# Patient Record
Sex: FEMALE | Race: WHITE | NOT HISPANIC OR LATINO
[De-identification: names, ages, dates, MRNs, and addresses within clinical notes are randomized per-mention and may not be internally consistent; named-entity substitution may affect disease eponyms.]

---

## 2018-07-25 ENCOUNTER — RESULT CHARGE (OUTPATIENT)
Age: 28
End: 2018-07-25

## 2018-07-25 ENCOUNTER — APPOINTMENT (OUTPATIENT)
Dept: FAMILY MEDICINE | Facility: CLINIC | Age: 28
End: 2018-07-25
Payer: COMMERCIAL

## 2018-07-25 VITALS
DIASTOLIC BLOOD PRESSURE: 80 MMHG | WEIGHT: 152 LBS | HEART RATE: 70 BPM | HEIGHT: 64 IN | SYSTOLIC BLOOD PRESSURE: 114 MMHG | BODY MASS INDEX: 25.95 KG/M2 | OXYGEN SATURATION: 97 %

## 2018-07-25 DIAGNOSIS — Z78.9 OTHER SPECIFIED HEALTH STATUS: ICD-10-CM

## 2018-07-25 DIAGNOSIS — N80.9 ENDOMETRIOSIS, UNSPECIFIED: ICD-10-CM

## 2018-07-25 PROBLEM — Z00.00 ENCOUNTER FOR PREVENTIVE HEALTH EXAMINATION: Status: ACTIVE | Noted: 2018-07-25

## 2018-07-25 LAB
BILIRUB UR QL STRIP: NORMAL
CYTOLOGY CVX/VAG DOC THIN PREP: NORMAL
GLUCOSE UR-MCNC: NORMAL
HCG UR QL: 0.2 EU/DL
HGB UR QL STRIP.AUTO: NORMAL
KETONES UR-MCNC: NORMAL
LEUKOCYTE ESTERASE UR QL STRIP: NORMAL
NITRITE UR QL STRIP: NORMAL
PH UR STRIP: 7
PROT UR STRIP-MCNC: NORMAL
SP GR UR STRIP: 1.01

## 2018-07-25 PROCEDURE — 99213 OFFICE O/P EST LOW 20 MIN: CPT | Mod: 25

## 2018-07-25 PROCEDURE — 81003 URINALYSIS AUTO W/O SCOPE: CPT | Mod: QW

## 2018-07-25 NOTE — PHYSICAL EXAM
[No Acute Distress] : no acute distress [Well Nourished] : well nourished [Well Developed] : well developed [Well-Appearing] : well-appearing [No Respiratory Distress] : no respiratory distress  [Clear to Auscultation] : lungs were clear to auscultation bilaterally [No Accessory Muscle Use] : no accessory muscle use [Normal Rate] : normal rate  [Regular Rhythm] : with a regular rhythm [Normal S1, S2] : normal S1 and S2 [No Murmur] : no murmur heard [Soft] : abdomen soft [Non-distended] : non-distended [No Masses] : no abdominal mass palpated [No CVA Tenderness] : no CVA  tenderness [Normal Gait] : normal gait [Coordination Grossly Intact] : coordination grossly intact [No Focal Deficits] : no focal deficits [Normal Affect] : the affect was normal [Alert and Oriented x3] : oriented to person, place, and time [Normal Insight/Judgement] : insight and judgment were intact [de-identified] : mild lower abdominal/ pelvic pain

## 2018-07-25 NOTE — HISTORY OF PRESENT ILLNESS
[FreeTextEntry8] : Pt is here for poss UTI. Pt c/o lower back/abdominal pain, burning urination, frequent/urgent need to urinate for about 5 days and blood in urine for about 2 days. Taking urostat with some relief. Patient also has a h/o endometriosis, has appt scheduled with gyn in 2 weeks for follow up, she reports has been spotting. Denies known fever, chills, nausea, vomiting.

## 2018-07-25 NOTE — PLAN
[FreeTextEntry1] : Check culture\par Start abx, take with food/probiotic \par UTI preventative measures reviewed with patient \par RTO if symptoms worsen, persist or fevers develop\par

## 2018-07-27 LAB — BACTERIA UR CULT: ABNORMAL

## 2018-08-03 ENCOUNTER — APPOINTMENT (OUTPATIENT)
Dept: FAMILY MEDICINE | Facility: CLINIC | Age: 28
End: 2018-08-03
Payer: COMMERCIAL

## 2018-08-03 VITALS
DIASTOLIC BLOOD PRESSURE: 70 MMHG | HEART RATE: 72 BPM | OXYGEN SATURATION: 98 % | TEMPERATURE: 98.9 F | SYSTOLIC BLOOD PRESSURE: 120 MMHG

## 2018-08-03 LAB — S PYO AG SPEC QL IA: NORMAL

## 2018-08-03 PROCEDURE — 87880 STREP A ASSAY W/OPTIC: CPT | Mod: QW

## 2018-08-03 PROCEDURE — 92567 TYMPANOMETRY: CPT

## 2018-08-03 PROCEDURE — 99213 OFFICE O/P EST LOW 20 MIN: CPT | Mod: 25

## 2018-08-03 RX ORDER — NORETHINDRONE ACETATE AND ETHINYL ESTRADIOL 1; 20 MG/1; UG/1
1-20 TABLET ORAL
Qty: 21 | Refills: 0 | Status: DISCONTINUED | COMMUNITY
Start: 2017-12-07 | End: 2018-08-03

## 2018-08-03 RX ORDER — NITROFURANTOIN (MONOHYDRATE/MACROCRYSTALS) 25; 75 MG/1; MG/1
100 CAPSULE ORAL TWICE DAILY
Qty: 10 | Refills: 0 | Status: DISCONTINUED | COMMUNITY
Start: 2018-07-25 | End: 2018-08-03

## 2018-08-03 NOTE — ASSESSMENT
[FreeTextEntry1] : Take antibiotics,  rest,  increase fluids, wash hands to prevent the spread of  infection . Take mucinex dm over the counter. Take tylenol or advil for fever or body aches. Follow up if no better or worse respiratory symptoms.\par tympanogram\par cough syrup prescribed. \par We spent sufficient time to discuss aspects of care; questions were answered  to patient's satisfaction.The diagnosis and care plan were discussed with patient in detail.  Patient test results were  reviewed and explained in full. All questions were answered.\par

## 2018-08-03 NOTE — PHYSICAL EXAM
[Ill-Appearing] : ill-appearing [Barky Cough] : a barky cough was heard [Wheezing Bilaterally] : no wheezing was heard [Rhonchi Bilateral] : rhonchi were heard diffusely over both lungs [Decreased Breath Sounds] : breath sounds were not diminished [Normal Rate] : normal [Normal S1] : normal S1 [Normal S2] : normal S2 [No Murmur] : no murmurs heard [No Pitting Edema] : no pitting edema present [2+] : left 2+ [No Abnormalities] : the abdominal aorta was not enlarged and no bruit was heard [S3] : no S3 [S4] : no S4 [Right Carotid Bruit] : no bruit heard over the right carotid [Left Carotid Bruit] : no bruit heard over the left carotid [Right Femoral Bruit] : no bruit heard over the right femoral artery [Left Femoral Bruit] : no bruit heard over the left femoral artery [de-identified] : small wax noted

## 2018-08-03 NOTE — HISTORY OF PRESENT ILLNESS
[FreeTextEntry8] : Pt is here c/o pain in bilateral ears radiating down jaw for about 5 days. Pt states it started as a sore throat and she lost her voice for 2 days. Pt states she has a phlegmy cough in the morning and through out the day it turns into a dry cough. I am taking advil cold and sinus.  I usually can fight it but I have an low immune system right now I just had UTI

## 2018-08-20 ENCOUNTER — RX CHANGE (OUTPATIENT)
Age: 28
End: 2018-08-20

## 2019-08-14 ENCOUNTER — APPOINTMENT (OUTPATIENT)
Dept: FAMILY MEDICINE | Facility: CLINIC | Age: 29
End: 2019-08-14
Payer: COMMERCIAL

## 2019-08-14 VITALS
WEIGHT: 137 LBS | RESPIRATION RATE: 14 BRPM | DIASTOLIC BLOOD PRESSURE: 70 MMHG | HEART RATE: 80 BPM | BODY MASS INDEX: 23.39 KG/M2 | OXYGEN SATURATION: 99 % | SYSTOLIC BLOOD PRESSURE: 110 MMHG | HEIGHT: 64 IN

## 2019-08-14 LAB — S PYO AG SPEC QL IA: NORMAL

## 2019-08-14 PROCEDURE — 99213 OFFICE O/P EST LOW 20 MIN: CPT | Mod: 25

## 2019-08-14 PROCEDURE — 87880 STREP A ASSAY W/OPTIC: CPT | Mod: QW

## 2019-08-14 RX ORDER — PROMETHAZINE HYDROCHLORIDE AND CODEINE PHOSPHATE 6.25; 1 MG/5ML; MG/5ML
6.25-1 SOLUTION ORAL
Qty: 100 | Refills: 0 | Status: COMPLETED | COMMUNITY
Start: 2018-08-03 | End: 2019-08-14

## 2019-08-14 RX ORDER — CLARITHROMYCIN 500 MG/1
500 TABLET, FILM COATED ORAL
Qty: 20 | Refills: 0 | Status: COMPLETED | COMMUNITY
Start: 2018-08-03 | End: 2019-08-14

## 2019-08-14 RX ORDER — CEFUROXIME AXETIL 500 MG/1
500 TABLET ORAL
Qty: 14 | Refills: 0 | Status: COMPLETED | COMMUNITY
Start: 2018-08-20 | End: 2019-08-14

## 2019-08-14 RX ORDER — NORETHINDRONE ACETATE AND ETHINYL ESTRADIOL AND FERROUS FUMARATE 1MG-20(21)
1-20 KIT ORAL
Qty: 28 | Refills: 0 | Status: DISCONTINUED | COMMUNITY
Start: 2018-07-28 | End: 2019-08-14

## 2019-08-15 NOTE — HEALTH RISK ASSESSMENT
[] : No [No] : In the past 12 months have you used drugs other than those required for medical reasons? No [Audit-CScore] : 0

## 2019-08-15 NOTE — HISTORY OF PRESENT ILLNESS
[___ Days ago] : [unfilled] days ago [FreeTextEntry8] : 30 yo wf here for left ear pain  sore throat headache white pus pockets dry cough sinus congestion,   It is getting progressively worse with no relief from over the counter agents.\par

## 2019-08-15 NOTE — PHYSICAL EXAM
[Ill-Appearing] : ill-appearing [Normal] : normal rate, regular rhythm, normal S1 and S2 and no murmur heard [de-identified] :  nasal passages erythema and purulent discharge, frontal sinuses are tender bilaterally. postnasal drip. bilateral wax

## 2019-08-15 NOTE — ASSESSMENT
[FreeTextEntry1] : .throat culture neg\par Take Cepacol lozenges as directed and as needed for sore throat.  Breathe warm, moist air from a steamy shower.  Avoid cold dry air. Use humidifier in the home. Follow cleaning instructions of humidifier.  Use saline nasal washes.  Supportive care to include adequate fluid intake and salt water gargles.  Antibiotics are not always indicated with these symptoms, as it depends on your symptoms, the length of the illness and other comorbidities you may have. If you have not been started on antibiotics, continue the supportive care discussed. Return to office or call  if symptoms persist or worsen with swelling or redness in face or eyes, or if symptoms continue or worsen greater than 10 days in length, fever , thick green sputum through out the day or if yo develop unilateral pain. . Go to the ER if persistent fever, difficulty breathing drooling, neck swelling or inability to swallow.\par \par Take antibiotics,  rest,  increase fluids, wash hands to prevent the spread of  infection . Take mucinex   over the counter. Take tylenol or advil for fever or body aches. Follow up if no better or worse respiratory symptoms.\par follow up ear irrigation\par \par

## 2019-08-23 ENCOUNTER — APPOINTMENT (OUTPATIENT)
Dept: FAMILY MEDICINE | Facility: CLINIC | Age: 29
End: 2019-08-23
Payer: COMMERCIAL

## 2019-08-23 VITALS
DIASTOLIC BLOOD PRESSURE: 60 MMHG | WEIGHT: 140 LBS | HEART RATE: 66 BPM | RESPIRATION RATE: 14 BRPM | SYSTOLIC BLOOD PRESSURE: 90 MMHG | HEIGHT: 64 IN | BODY MASS INDEX: 23.9 KG/M2 | OXYGEN SATURATION: 98 %

## 2019-08-23 PROCEDURE — 99213 OFFICE O/P EST LOW 20 MIN: CPT

## 2019-08-23 NOTE — HISTORY OF PRESENT ILLNESS
[FreeTextEntry8] : patient presents for ear irrigation \par \par I had been on the abx for at least a week and then I started to get better but I was so congested. My ears feel better though.

## 2019-08-23 NOTE — PHYSICAL EXAM
[Normal] : normal rate, regular rhythm, normal S1 and S2 and no murmur heard [de-identified] : wax

## 2019-08-23 NOTE — ASSESSMENT
[FreeTextEntry1] : ear irrigation\par try sprays for congestion\par call back if green sputum or facial pain, headache persists.\par We spent sufficient time to discuss aspects of care; questions were answered  to patient's satisfaction.The diagnosis and care plan were discussed with patient in detail.  Patient test results were  reviewed and explained in full. All questions and concerns  were answered to the best of my knowledge.\par

## 2019-08-23 NOTE — HEALTH RISK ASSESSMENT
[No] : In the past 12 months have you used drugs other than those required for medical reasons? No [] : No [Audit-CScore] : 0 [de-identified] : yes

## 2020-02-04 ENCOUNTER — NON-APPOINTMENT (OUTPATIENT)
Age: 30
End: 2020-02-04

## 2020-02-04 ENCOUNTER — APPOINTMENT (OUTPATIENT)
Dept: FAMILY MEDICINE | Facility: CLINIC | Age: 30
End: 2020-02-04
Payer: COMMERCIAL

## 2020-02-04 VITALS
BODY MASS INDEX: 24.75 KG/M2 | HEART RATE: 68 BPM | TEMPERATURE: 98.4 F | HEIGHT: 64 IN | WEIGHT: 145 LBS | OXYGEN SATURATION: 98 % | SYSTOLIC BLOOD PRESSURE: 104 MMHG | DIASTOLIC BLOOD PRESSURE: 64 MMHG | RESPIRATION RATE: 14 BRPM

## 2020-02-04 DIAGNOSIS — J06.9 ACUTE UPPER RESPIRATORY INFECTION, UNSPECIFIED: ICD-10-CM

## 2020-02-04 DIAGNOSIS — R39.9 UNSPECIFIED SYMPTOMS AND SIGNS INVOLVING THE GENITOURINARY SYSTEM: ICD-10-CM

## 2020-02-04 DIAGNOSIS — Z86.69 PERSONAL HISTORY OF OTHER DISEASES OF THE NERVOUS SYSTEM AND SENSE ORGANS: ICD-10-CM

## 2020-02-04 DIAGNOSIS — Z87.09 PERSONAL HISTORY OF OTHER DISEASES OF THE RESPIRATORY SYSTEM: ICD-10-CM

## 2020-02-04 DIAGNOSIS — Z87.898 PERSONAL HISTORY OF OTHER SPECIFIED CONDITIONS: ICD-10-CM

## 2020-02-04 LAB
FLUAV SPEC QL CULT: POSITIVE
FLUBV AG SPEC QL IA: NORMAL

## 2020-02-04 PROCEDURE — 87804 INFLUENZA ASSAY W/OPTIC: CPT | Mod: 59,QW

## 2020-02-04 PROCEDURE — 99214 OFFICE O/P EST MOD 30 MIN: CPT | Mod: 25

## 2020-02-04 PROCEDURE — 93000 ELECTROCARDIOGRAM COMPLETE: CPT

## 2020-02-04 PROCEDURE — 94640 AIRWAY INHALATION TREATMENT: CPT

## 2020-02-04 RX ORDER — AMOXICILLIN AND CLAVULANATE POTASSIUM 875; 125 MG/1; MG/1
875-125 TABLET, COATED ORAL
Qty: 20 | Refills: 0 | Status: DISCONTINUED | COMMUNITY
Start: 2019-08-14 | End: 2020-02-04

## 2020-02-04 RX ORDER — FLUTICASONE PROPIONATE 50 UG/1
50 SPRAY, METERED NASAL DAILY
Qty: 1 | Refills: 3 | Status: DISCONTINUED | COMMUNITY
Start: 2019-08-23 | End: 2020-02-04

## 2020-02-04 RX ORDER — ALBUTEROL SULFATE 2.5 MG/3ML
(2.5 MG/3ML) SOLUTION RESPIRATORY (INHALATION)
Qty: 0 | Refills: 0 | Status: COMPLETED | OUTPATIENT
Start: 2020-02-04

## 2020-02-04 RX ORDER — IPRATROPIUM BROMIDE 42 UG/1
0.06 SPRAY NASAL
Qty: 1 | Refills: 11 | Status: DISCONTINUED | COMMUNITY
Start: 2019-08-23 | End: 2020-02-04

## 2020-02-04 RX ADMIN — ALBUTEROL SULFATE 1 0.083%: 2.5 SOLUTION RESPIRATORY (INHALATION) at 00:00

## 2020-02-04 NOTE — REVIEW OF SYSTEMS
[Chills] : chills [Chest Pain] : chest pain [Shortness Of Breath] : shortness of breath [Wheezing] : wheezing [Cough] : cough [Muscle Pain] : muscle pain [Fever] : no fever

## 2020-02-04 NOTE — PHYSICAL EXAM
[Supple] : supple [No Respiratory Distress] : no respiratory distress  [No Accessory Muscle Use] : no accessory muscle use [Normal] : normal rate, regular rhythm, normal S1 and S2 and no murmur heard [No Edema] : there was no peripheral edema [No Rash] : no rash [No Focal Deficits] : no focal deficits [Normal Affect] : the affect was normal [Normal Mood] : the mood was normal [Normal Insight/Judgement] : insight and judgment were intact [de-identified] : rhonci left lung base [de-identified] : b/l anterior cervical lymphadenopathy

## 2020-02-04 NOTE — PLAN
[FreeTextEntry1] : Flu\par rapid flu + Flu A\par Tamiflu bid x 5 days\par Increase fluids, Rest, bland diet\par \par Cough\par -CXR stat- advised pt to look for my call for results today \par -Advised humidifier  use\par -Trial of albuterol inhaler\par -tessalon perles tid prn\par -inc fluids\par -flonase/azelastine for postnasal drip component \par nebulizer given in office\par \par -EKG- NSR @  81 BPM, RIGHT  AXIS, NORMAL SC/QT INTERVAL, T WAVE INVERSION LEAD III NO OTHER ST/ T WAVE ABNORMALITIES NOTED\par EKG reviewed\par \par repeat ekg once feeling better next visit 1 week \par f/u 3 days if no relief, pt agreed w/plan\par \par f/u 1 week or sooner if worsening, pt agreed w/plan\par

## 2020-02-04 NOTE — HISTORY OF PRESENT ILLNESS
[___ Days ago] : [unfilled] days ago [FreeTextEntry8] : c/o feeling sick since sunday\par tactile fevers, chills , body aches, coughing w/ white and green sputum no blood\par denies sick contacts or recent travel or contact with people from [place]\par she took dayquil at 6am and she felt slight relief\par +sob , wheezing , +chest pain intermittent worse with coughing \par

## 2020-02-12 ENCOUNTER — NON-APPOINTMENT (OUTPATIENT)
Age: 30
End: 2020-02-12

## 2020-02-12 ENCOUNTER — APPOINTMENT (OUTPATIENT)
Dept: FAMILY MEDICINE | Facility: CLINIC | Age: 30
End: 2020-02-12
Payer: COMMERCIAL

## 2020-02-12 VITALS
SYSTOLIC BLOOD PRESSURE: 106 MMHG | WEIGHT: 145 LBS | HEIGHT: 64 IN | TEMPERATURE: 98.3 F | BODY MASS INDEX: 24.75 KG/M2 | DIASTOLIC BLOOD PRESSURE: 70 MMHG | OXYGEN SATURATION: 99 % | RESPIRATION RATE: 14 BRPM | HEART RATE: 63 BPM

## 2020-02-12 DIAGNOSIS — R94.31 ABNORMAL ELECTROCARDIOGRAM [ECG] [EKG]: ICD-10-CM

## 2020-02-12 DIAGNOSIS — R07.89 OTHER CHEST PAIN: ICD-10-CM

## 2020-02-12 DIAGNOSIS — J10.1 INFLUENZA DUE TO OTHER IDENTIFIED INFLUENZA VIRUS WITH OTHER RESPIRATORY MANIFESTATIONS: ICD-10-CM

## 2020-02-12 DIAGNOSIS — Z87.09 PERSONAL HISTORY OF OTHER DISEASES OF THE RESPIRATORY SYSTEM: ICD-10-CM

## 2020-02-12 PROCEDURE — 93000 ELECTROCARDIOGRAM COMPLETE: CPT

## 2020-02-12 PROCEDURE — 99213 OFFICE O/P EST LOW 20 MIN: CPT | Mod: 25

## 2020-02-12 RX ORDER — BENZONATATE 100 MG/1
100 CAPSULE ORAL
Qty: 30 | Refills: 0 | Status: DISCONTINUED | COMMUNITY
Start: 2020-02-04 | End: 2020-02-12

## 2020-02-12 RX ORDER — OSELTAMIVIR PHOSPHATE 75 MG/1
75 CAPSULE ORAL TWICE DAILY
Qty: 10 | Refills: 0 | Status: DISCONTINUED | COMMUNITY
Start: 2020-02-04 | End: 2020-02-12

## 2020-02-12 NOTE — HISTORY OF PRESENT ILLNESS
[FreeTextEntry1] : pt present for one week follow of the flu and cough [de-identified] : 30yo female presents for follow up for flu and cough \par \par she is feeling much better\par she completed tamiflu\par cough is less and dry and less frequent\par denies sob or chest pain\par she was able to walk 3 miles without issues just felt a little winded but was able to complete it after being sick with flu\par \par \par

## 2020-02-12 NOTE — PLAN
[FreeTextEntry1] : Flu\par rapid flu + Flu A\par s/p Tamiflu bid x 5 days\par Increase fluids, Rest, bland diet\par \par Cough improving \par -CXR stat- wnl\par -Advised humidifier use\par -s/p albuterol inhaler-advised to continue due to lingering cough \par -s/p tessalon perles tid prn\par -inc fluids\par -s/p flonase/azelastine for postnasal drip component \par \par -EKG- Sinus bradycardia @ 85 BPM, NORMAL AXIS, NORMAL NH/QT INTERVAL, NO ST/ T WAVE ABNORMALITIES NOTED\par EKG reviewed\par ekg repeated due to right axis deviation last time but resolved \par \par f/u as needed if cough doesn’t resolve in 2 weeks pt agreed w/plan

## 2020-02-12 NOTE — PHYSICAL EXAM
[No Lymphadenopathy] : no lymphadenopathy [Normal] : no respiratory distress, lungs were clear to auscultation bilaterally and no accessory muscle use [Supple] : supple [No Edema] : there was no peripheral edema [No Focal Deficits] : no focal deficits [Normal Mood] : the mood was normal [Normal Affect] : the affect was normal [Normal Insight/Judgement] : insight and judgment were intact

## 2020-11-18 ENCOUNTER — APPOINTMENT (OUTPATIENT)
Dept: FAMILY MEDICINE | Facility: CLINIC | Age: 30
End: 2020-11-18
Payer: COMMERCIAL

## 2020-11-18 VITALS
RESPIRATION RATE: 14 BRPM | TEMPERATURE: 97.2 F | OXYGEN SATURATION: 98 % | DIASTOLIC BLOOD PRESSURE: 80 MMHG | BODY MASS INDEX: 24.75 KG/M2 | HEART RATE: 78 BPM | HEIGHT: 64 IN | WEIGHT: 145 LBS | SYSTOLIC BLOOD PRESSURE: 110 MMHG

## 2020-11-18 VITALS — TEMPERATURE: 97.2 F

## 2020-11-18 DIAGNOSIS — Z23 ENCOUNTER FOR IMMUNIZATION: ICD-10-CM

## 2020-11-18 LAB — CYTOLOGY CVX/VAG DOC THIN PREP: NORMAL

## 2020-11-18 PROCEDURE — 90715 TDAP VACCINE 7 YRS/> IM: CPT

## 2020-11-18 PROCEDURE — 90471 IMMUNIZATION ADMIN: CPT

## 2020-11-18 NOTE — HISTORY OF PRESENT ILLNESS
[FreeTextEntry1] : patient presents for tdap vaccine \par  [de-identified] : 31yo F presents for tdap \par she is feeling fine\par \par she said she needs the form filled out that she had the vaccine too\par she doesn’t need any other titers\par she has proof of mmr

## 2020-11-18 NOTE — PHYSICAL EXAM
[No Lymphadenopathy] : no lymphadenopathy [Supple] : supple [Normal] : normal rate, regular rhythm, normal S1 and S2 and no murmur heard [No Edema] : there was no peripheral edema [No Focal Deficits] : no focal deficits [Normal Affect] : the affect was normal [Normal Mood] : the mood was normal [Normal Insight/Judgement] : insight and judgment were intact [de-identified] : no calf tenderness b/l LE

## 2020-11-18 NOTE — PLAN
[FreeTextEntry1] : \par needed tdap for school and form filled out \par tdap  vaccine given left arm IM\par no adverse reactions noted, consent obtained\par \par form given to pt and scanned in by \par pt agreed w/plan

## 2020-12-23 PROBLEM — Z87.09 HISTORY OF INFLUENZA: Status: RESOLVED | Noted: 2020-02-12 | Resolved: 2020-12-23

## 2021-09-09 ENCOUNTER — MED ADMIN CHARGE (OUTPATIENT)
Age: 31
End: 2021-09-09

## 2021-09-09 ENCOUNTER — APPOINTMENT (OUTPATIENT)
Dept: FAMILY MEDICINE | Facility: CLINIC | Age: 31
End: 2021-09-09
Payer: COMMERCIAL

## 2021-09-09 ENCOUNTER — NON-APPOINTMENT (OUTPATIENT)
Age: 31
End: 2021-09-09

## 2021-09-09 VITALS — TEMPERATURE: 97.8 F

## 2021-09-09 PROCEDURE — 90471 IMMUNIZATION ADMIN: CPT

## 2021-09-09 PROCEDURE — 90715 TDAP VACCINE 7 YRS/> IM: CPT

## 2023-11-26 ENCOUNTER — NON-APPOINTMENT (OUTPATIENT)
Age: 33
End: 2023-11-26

## 2024-01-01 DIAGNOSIS — Z13.29 ENCOUNTER FOR SCREENING FOR OTHER SUSPECTED ENDOCRINE DISORDER: ICD-10-CM

## 2024-01-01 DIAGNOSIS — Z13.220 ENCOUNTER FOR SCREENING FOR LIPOID DISORDERS: ICD-10-CM

## 2024-01-01 DIAGNOSIS — Z13.1 ENCOUNTER FOR SCREENING FOR DIABETES MELLITUS: ICD-10-CM

## 2024-01-01 DIAGNOSIS — Z13.0 ENCOUNTER FOR SCREENING FOR DISEASES OF THE BLOOD AND BLOOD-FORMING ORGANS AND CERTAIN DISORDERS INVOLVING THE IMMUNE MECHANISM: ICD-10-CM

## 2024-01-03 ENCOUNTER — APPOINTMENT (OUTPATIENT)
Dept: FAMILY MEDICINE | Facility: CLINIC | Age: 34
End: 2024-01-03
Payer: COMMERCIAL

## 2024-01-03 ENCOUNTER — TRANSCRIPTION ENCOUNTER (OUTPATIENT)
Age: 34
End: 2024-01-03

## 2024-01-03 VITALS
HEART RATE: 68 BPM | SYSTOLIC BLOOD PRESSURE: 116 MMHG | OXYGEN SATURATION: 98 % | WEIGHT: 180 LBS | HEIGHT: 64 IN | BODY MASS INDEX: 30.73 KG/M2 | DIASTOLIC BLOOD PRESSURE: 70 MMHG

## 2024-01-03 DIAGNOSIS — Z76.89 PERSONS ENCOUNTERING HEALTH SERVICES IN OTHER SPECIFIED CIRCUMSTANCES: ICD-10-CM

## 2024-01-03 DIAGNOSIS — R63.5 ABNORMAL WEIGHT GAIN: ICD-10-CM

## 2024-01-03 LAB — CYTOLOGY CVX/VAG DOC THIN PREP: NORMAL

## 2024-01-03 PROCEDURE — 99204 OFFICE O/P NEW MOD 45 MIN: CPT

## 2024-01-03 RX ORDER — FLUTICASONE PROPIONATE 50 UG/1
50 SPRAY, METERED NASAL DAILY
Qty: 1 | Refills: 0 | Status: DISCONTINUED | COMMUNITY
Start: 2020-02-04 | End: 2024-01-03

## 2024-01-03 RX ORDER — AZELASTINE HYDROCHLORIDE 137 UG/1
0.1 SPRAY, METERED NASAL DAILY
Qty: 1 | Refills: 0 | Status: DISCONTINUED | COMMUNITY
Start: 2020-02-04 | End: 2024-01-03

## 2024-01-03 RX ORDER — ALBUTEROL SULFATE 90 UG/1
108 (90 BASE) INHALANT RESPIRATORY (INHALATION)
Qty: 1 | Refills: 1 | Status: DISCONTINUED | COMMUNITY
Start: 2020-02-04 | End: 2024-01-03

## 2024-01-03 NOTE — HEALTH RISK ASSESSMENT
[Intercurrent hospitalizations] : was admitted to the hospital  [1 or 2 (0 pts)] : 1 or 2 (0 points) [Never (0 pts)] : Never (0 points) [No] : In the past 12 months have you used drugs other than those required for medical reasons? No [No falls in past year] : Patient reported no falls in the past year [Little interest or pleasure doing things] : 1) Little interest or pleasure doing things [Feeling down, depressed, or hopeless] : 2) Feeling down, depressed, or hopeless [0] : 2) Feeling down, depressed, or hopeless: Not at all (0) [PHQ-2 Negative - No further assessment needed] : PHQ-2 Negative - No further assessment needed [I have developed a follow-up plan documented below in the note.] : I have developed a follow-up plan documented below in the note. [Patient reported PAP Smear was normal] : Patient reported PAP Smear was normal [Patient declined colonoscopy] : Patient declined colonoscopy [HIV test declined] : HIV test declined [Hepatitis C test declined] : Hepatitis C test declined [None] : None [With Family] : lives with family [# of Members in Household ___] :  household currently consist of [unfilled] member(s) [Employed] : employed [Graduate School] : graduate school [] :  [# Of Children ___] : has [unfilled] children [Sexually Active] : sexually active [Feels Safe at Home] : Feels safe at home [Fully functional (bathing, dressing, toileting, transferring, walking, feeding)] : Fully functional (bathing, dressing, toileting, transferring, walking, feeding) [Fully functional (using the telephone, shopping, preparing meals, housekeeping, doing laundry, using] : Fully functional and needs no help or supervision to perform IADLs (using the telephone, shopping, preparing meals, housekeeping, doing laundry, using transportation, managing medications and managing finances) [Reports normal functional visual acuity (ie: able to read med bottle)] : Reports normal functional visual acuity [Smoke Detector] : smoke detector [Carbon Monoxide Detector] : carbon monoxide detector [Safety elements used in home] : safety elements used in home [Seat Belt] :  uses seat belt [Sunscreen] : uses sunscreen [With Patient/Caregiver] : , with patient/caregiver [Designated Healthcare Proxy] : Designated healthcare proxy [Name: ___] : Health Care Proxy's Name: [unfilled]  [Relationship: ___] : Relationship: [unfilled] [Aggressive treatment] : aggressive treatment [Never] : Never [FreeTextEntry1] : establish care [de-identified] : meghan cantu [de-identified] : ob, derm  ( dr Rick Graf .            [facility] [employer]  [de-identified] : work out a few times per week [de-identified] : no [[id number]] : 0 [EyeExamDate] : 10/2021 [Change in mental status noted] : No change in mental status noted [Language] : denies difficulty with language [Behavior] : denies difficulty with behavior [Learning/Retaining New Information] : denies difficulty learning/retaining new information [Handling Complex Tasks] : denies difficulty handling complex tasks [Reasoning] : denies difficulty with reasoning [Spatial Ability and Orientation] : denies difficulty with spatial ability and orientation [Reports changes in hearing] : Reports no changes in hearing [Reports changes in vision] : Reports no changes in vision [Reports changes in dental health] : Reports no changes in dental health [Guns at Home] : no guns at home [Travel to Developing Areas] : does not  travel to developing areas [TB Exposure] : is not being exposed to tuberculosis [Caregiver Concerns] : does not have caregiver concerns [PapSmearDate] : 12/13/2023 [ColonoscopyDate] : 2023 [AdvancecareDate] : 1/3/24

## 2024-01-03 NOTE — ASSESSMENT
[FreeTextEntry1] : pt here to  reestablish care. she just had a baby in April. she is not nursing. She has no complaints except she is not able to lose the weight like the first pregnancy. she does not want any vaccinations flu...  The BMI for PURNIMA was assessed. The BMI was found to be high, intervention performed was: lifestyle education regarding diet.  It is important to exercise 150 minutes per week as well as maintain a calorie appropriate diet that is rich in fruits and vegetables, and low in excess salt and fat. Labs to be drawn/ specimens obtained  at outside  lab    for evaluation of   assessed conditions - cbc cmp lipid    hgba1c for physical and screening purposes.

## 2024-01-03 NOTE — HISTORY OF PRESENT ILLNESS
[FreeTextEntry1] : Patient presents to re-establish care.  [de-identified] : Born in [facility] she is generally healthy . she had tonsillectomy and sinus surgery in [facility]. she attended [employer] for masters in Public Health  she works for [employer] as a . she is   and has  2 children .Her 2 girls are 18 months  apart. My first child was a two vessel cord. they monitored her weight to make sure she had enough nutrients but she was fine. My daughter has a milk allergy   I am finding it hard to lose weight. withthis second baby

## 2024-04-11 ENCOUNTER — TRANSCRIPTION ENCOUNTER (OUTPATIENT)
Age: 34
End: 2024-04-11